# Patient Record
Sex: MALE | Race: WHITE | Employment: FULL TIME | URBAN - METROPOLITAN AREA
[De-identification: names, ages, dates, MRNs, and addresses within clinical notes are randomized per-mention and may not be internally consistent; named-entity substitution may affect disease eponyms.]

---

## 2017-11-09 ENCOUNTER — HOSPITAL ENCOUNTER (EMERGENCY)
Facility: HOSPITAL | Age: 42
Discharge: HOME | End: 2017-11-09
Attending: EMERGENCY MEDICINE

## 2017-11-09 ENCOUNTER — APPOINTMENT (OUTPATIENT)
Dept: CT IMAGING | Facility: HOSPITAL | Age: 42
End: 2017-11-09
Attending: EMERGENCY MEDICINE

## 2017-11-09 VITALS
RESPIRATION RATE: 14 BRPM | HEART RATE: 70 BPM | OXYGEN SATURATION: 97 % | WEIGHT: 156 LBS | DIASTOLIC BLOOD PRESSURE: 70 MMHG | HEIGHT: 68 IN | TEMPERATURE: 99 F | BODY MASS INDEX: 23.64 KG/M2 | SYSTOLIC BLOOD PRESSURE: 117 MMHG

## 2017-11-09 DIAGNOSIS — R10.31 ABDOMINAL PAIN, RIGHT LOWER QUADRANT: ICD-10-CM

## 2017-11-09 DIAGNOSIS — S29.019A STRAIN OF THORACIC REGION, INITIAL ENCOUNTER: Primary | ICD-10-CM

## 2017-11-09 PROCEDURE — 80053 COMPREHEN METABOLIC PANEL: CPT | Performed by: EMERGENCY MEDICINE

## 2017-11-09 PROCEDURE — 99284 EMERGENCY DEPT VISIT MOD MDM: CPT

## 2017-11-09 PROCEDURE — 71275 CT ANGIOGRAPHY CHEST: CPT | Performed by: EMERGENCY MEDICINE

## 2017-11-09 PROCEDURE — 85025 COMPLETE CBC W/AUTO DIFF WBC: CPT | Performed by: EMERGENCY MEDICINE

## 2017-11-09 PROCEDURE — 81003 URINALYSIS AUTO W/O SCOPE: CPT | Performed by: EMERGENCY MEDICINE

## 2017-11-09 PROCEDURE — 74177 CT ABD & PELVIS W/CONTRAST: CPT | Performed by: EMERGENCY MEDICINE

## 2017-11-09 PROCEDURE — 96360 HYDRATION IV INFUSION INIT: CPT

## 2017-11-09 PROCEDURE — 84484 ASSAY OF TROPONIN QUANT: CPT | Performed by: EMERGENCY MEDICINE

## 2017-11-09 PROCEDURE — 83690 ASSAY OF LIPASE: CPT | Performed by: EMERGENCY MEDICINE

## 2017-11-09 RX ORDER — DICYCLOMINE HCL 20 MG
20 TABLET ORAL 4 TIMES DAILY PRN
Qty: 15 TABLET | Refills: 0 | Status: SHIPPED | OUTPATIENT
Start: 2017-11-09 | End: 2021-02-04

## 2017-11-09 RX ORDER — SODIUM CHLORIDE 9 MG/ML
1000 INJECTION, SOLUTION INTRAVENOUS ONCE
Status: COMPLETED | OUTPATIENT
Start: 2017-11-09 | End: 2017-11-09

## 2017-11-10 NOTE — ED PROVIDER NOTES
Patient Seen in: [facility] Emergency Department    History   Patient presents with:  Abdomen/Flank Pain (GI/)    Stated Complaint: abd pain    HPI    Patient is a 51-year-old male presents emergency room with a history of multiple complaints.   Sindi Miller Temp 98.9 °F (37.2 °C) (Temporal)   Resp 14   Ht 172.7 cm (5' 8\")   Wt 70.8 kg   SpO2 97%   BMI 23.72 kg/m²         Physical Exam    GENERAL: Well-developed, well-nourished male sitting up breathing easily in no apparent distress.   Patient is nontoxic in DIFFERENTIAL WITH PLATELET    Narrative: The following orders were created for panel order CBC WITH DIFFERENTIAL WITH PLATELET.   Procedure                               Abnormality         Status                     --------- medications    Dicyclomine HCl 20 MG Oral Tab  Take 1 tablet (20 mg total) by mouth 4 (four) times daily as needed.   Qty: 15 tablet Refills: 0

## 2021-02-04 ENCOUNTER — OFFICE VISIT (OUTPATIENT)
Dept: INTEGRATIVE MEDICINE | Facility: CLINIC | Age: 46
End: 2021-02-04
Payer: COMMERCIAL

## 2021-02-04 VITALS
WEIGHT: 156.19 LBS | SYSTOLIC BLOOD PRESSURE: 108 MMHG | HEIGHT: 68 IN | OXYGEN SATURATION: 97 % | HEART RATE: 78 BPM | BODY MASS INDEX: 23.67 KG/M2 | DIASTOLIC BLOOD PRESSURE: 60 MMHG

## 2021-02-04 DIAGNOSIS — F81.0 READING DIFFICULTY: ICD-10-CM

## 2021-02-04 DIAGNOSIS — L60.9 NAIL ABNORMALITIES: ICD-10-CM

## 2021-02-04 DIAGNOSIS — R41.840 POOR CONCENTRATION: Primary | ICD-10-CM

## 2021-02-04 DIAGNOSIS — L65.9 HAIR LOSS: ICD-10-CM

## 2021-02-04 DIAGNOSIS — L60.8 RAGGED CUTICLE: ICD-10-CM

## 2021-02-04 PROCEDURE — 3008F BODY MASS INDEX DOCD: CPT | Performed by: FAMILY MEDICINE

## 2021-02-04 PROCEDURE — 3074F SYST BP LT 130 MM HG: CPT | Performed by: FAMILY MEDICINE

## 2021-02-04 PROCEDURE — 99204 OFFICE O/P NEW MOD 45 MIN: CPT | Performed by: FAMILY MEDICINE

## 2021-02-04 PROCEDURE — 3078F DIAST BP <80 MM HG: CPT | Performed by: FAMILY MEDICINE

## 2021-02-04 RX ORDER — FINASTERIDE 5 MG/1
5 TABLET, FILM COATED ORAL DAILY
COMMUNITY
Start: 2020-12-04

## 2021-02-04 NOTE — PATIENT INSTRUCTIONS
I have complete bertha in the body's ability to heal and transform. The products and items listed below (the “Products”)  and their claims have not been evaluated by the Food and Drug Administration.  Dietary products are not intended to treat, prevent, m Treatment protocols are individualized based on the results of the comprehensive brain mapping and neurocognitive evaluation. All of this data is reviewed with the family and/or the patient as a basis for understanding this brain network analysis.  If treat This is a Food Sensitivity Test that measures sensitivities to up to 132 different foods, coloring and additives.  The Food Inflammation Test, also known as the FIT Test, was created by Good Simon, Ph. D, who was involved in the creation of the first HIV/

## 2021-02-04 NOTE — PROGRESS NOTES
Tati Reyes is a 42year old male. Patient presents with:  New Patient  Integrative Medicine Consult: food sensitivites      HPI:     Has had food testing done [facility]) about 4 mos ago. Told to not eat meat, which he has been avoiding since.    Inter Never Used    Substance and Sexual Activity      Alcohol use: Yes        Frequency: Monthly or less        Drinks per session: 1 or 2        Binge frequency: Never      Drug use: Not on file      Sexual activity: Not on file    Lifestyle      Physical acti THYROID STIM HORMONE; Future  - FREE T4 (FREE THYROXINE); Future  - FREE T3 (TRIIODOTHYRONINE); Future  - DEHYDROEPIANDROSTERONE SULFATE; Future  - FERRITIN; Future  - PREGNENOLONE BY MS/MS, SERUM; Future  - TESTOSTERONE,FREE AND TOTAL;  Future  - FABRICIO I hormone panel ordered as above. [facility] testing performed today to evaluate for food sensitivities that may relate to GI symptoms and other low level symptoms of inflammation in the body.   [facility] micronutrient panel ordered to evaluate for micronu mental focus:  Equilibria Hemp-Based CBD oil - Daily Drops Regular Strength    Directions: Start 5 drops under the tongue twice daily  Where to Purchase: [email or web address]    Alpha Brain by Antonia      Directions: 2 capsules daily  Where to find: [employer] [email or web address]  Cost: $299    Vitamins  Vitamin A  Vitamin B1  Vitamin B2  Vitamin B3  Vitamin B5  Vitamin B6  Vitamin B12  Vitamin C  Vitamin D, 25-0H  Vitamin D3  Vitamin E  Vitamin K1  Vitamin K2  Folate    Minerals  Calc

## 2021-02-15 ENCOUNTER — TELEPHONE (OUTPATIENT)
Dept: INTEGRATIVE MEDICINE | Facility: CLINIC | Age: 46
End: 2021-02-15

## 2021-02-15 NOTE — TELEPHONE ENCOUNTER
Received voicemail from pt, he scheduled appt for labs tomorrow. Inquiring about instructions - fasting? Left message notifying pt no need to fast, no special instructions or prep prior to labs tomorrow.

## 2021-02-15 NOTE — TELEPHONE ENCOUNTER
Received call from pt, questions regarding [address] and [facility] testing. Discussed results are still pending, can take 4-6 weeks. Dr. Ronnie Velásquez will reach out once we receive results. Pt also has questions regarding getting blood work done.  Provided pt number

## 2021-02-16 ENCOUNTER — LAB ENCOUNTER (OUTPATIENT)
Dept: LAB | Facility: HOSPITAL | Age: 46
End: 2021-02-16
Attending: FAMILY MEDICINE
Payer: COMMERCIAL

## 2021-02-16 DIAGNOSIS — L60.8 RAGGED CUTICLE: ICD-10-CM

## 2021-02-16 DIAGNOSIS — L65.9 HAIR LOSS: ICD-10-CM

## 2021-02-16 DIAGNOSIS — L60.9 NAIL ABNORMALITIES: ICD-10-CM

## 2021-02-16 DIAGNOSIS — R41.840 POOR CONCENTRATION: ICD-10-CM

## 2021-02-16 DIAGNOSIS — F81.0 READING DIFFICULTY: ICD-10-CM

## 2021-02-16 LAB
DEPRECATED HBV CORE AB SER IA-ACNC: 207.2 NG/ML
DHEA-S SERPL-MCNC: 205.3 UG/DL
T3FREE SERPL-MCNC: 2.73 PG/ML (ref 2.4–4.2)
T4 FREE SERPL-MCNC: 0.9 NG/DL (ref 0.8–1.7)
THYROGLOB SERPL-MCNC: <15 U/ML (ref ?–60)
THYROPEROXIDASE AB SERPL-ACNC: 33 U/ML (ref ?–60)
TSI SER-ACNC: 1.12 MIU/ML (ref 0.36–3.74)

## 2021-02-16 PROCEDURE — 84402 ASSAY OF FREE TESTOSTERONE: CPT

## 2021-02-16 PROCEDURE — 36415 COLL VENOUS BLD VENIPUNCTURE: CPT

## 2021-02-16 PROCEDURE — 86628 CANDIDA ANTIBODY: CPT

## 2021-02-16 PROCEDURE — 82728 ASSAY OF FERRITIN: CPT

## 2021-02-16 PROCEDURE — 84443 ASSAY THYROID STIM HORMONE: CPT

## 2021-02-16 PROCEDURE — 84481 FREE ASSAY (FT-3): CPT

## 2021-02-16 PROCEDURE — 86800 THYROGLOBULIN ANTIBODY: CPT

## 2021-02-16 PROCEDURE — 84140 ASSAY OF PREGNENOLONE: CPT

## 2021-02-16 PROCEDURE — 84439 ASSAY OF FREE THYROXINE: CPT

## 2021-02-16 PROCEDURE — 86376 MICROSOMAL ANTIBODY EACH: CPT

## 2021-02-16 PROCEDURE — 84403 ASSAY OF TOTAL TESTOSTERONE: CPT

## 2021-02-16 PROCEDURE — 82627 DEHYDROEPIANDROSTERONE: CPT

## 2021-02-18 LAB
PREGNENOLONE BY MS/MS, SERUM: 40 NG/DL
TESTOSTERONE, FREE, S: 13.3 NG/DL
TESTOSTERONE, TOTAL, S: 370 NG/DL

## 2021-02-19 LAB
CANDIDA ANTIBODY IGA: 0.26 EV
CANDIDA ANTIBODY IGG: 0.71 EV
CANDIDA ANTIBODY IGM: 1.31 EV

## 2021-02-24 ENCOUNTER — TELEPHONE (OUTPATIENT)
Dept: INTEGRATIVE MEDICINE | Facility: CLINIC | Age: 46
End: 2021-02-24

## 2021-02-24 NOTE — TELEPHONE ENCOUNTER
Received call from pt, wondering if Candida diet can be mailed to him. Advised no, I can try to send via [employer]. Otherwise wait for it to come in the mail. Discussed Clinton Gama takes around 6 weeks to get results back.  Once we do get results, Dr. Raphael Gautam will re

## 2021-03-03 NOTE — TELEPHONE ENCOUNTER
[facility] - micronutrient (color copy) received.  Test was done on 02/04/21 - Copy made and placed in bin for review -  Pt has upcoming appointment on 04/27/21 - would you like color copy to be mailed to patient now or place in bin until patient's appointment

## 2021-04-03 ENCOUNTER — IMMUNIZATION (OUTPATIENT)
Dept: LAB | Age: 46
End: 2021-04-03

## 2021-04-03 DIAGNOSIS — Z23 NEED FOR VACCINATION: Primary | ICD-10-CM

## 2021-04-03 PROCEDURE — 0001A COVID 19 PFIZER-BIONTECH: CPT | Performed by: HOSPITALIST

## 2021-04-03 PROCEDURE — 91300 COVID 19 PFIZER-BIONTECH: CPT | Performed by: HOSPITALIST

## 2021-04-13 ENCOUNTER — MED REC SCAN ONLY (OUTPATIENT)
Dept: INTEGRATIVE MEDICINE | Facility: CLINIC | Age: 46
End: 2021-04-13

## 2021-04-24 ENCOUNTER — IMMUNIZATION (OUTPATIENT)
Dept: LAB | Age: 46
End: 2021-04-24
Attending: HOSPITALIST

## 2021-04-24 DIAGNOSIS — Z23 NEED FOR VACCINATION: Primary | ICD-10-CM

## 2021-04-24 PROCEDURE — 0002A COVID 19 PFIZER-BIONTECH: CPT | Performed by: HOSPITALIST

## 2021-04-24 PROCEDURE — 91300 COVID 19 PFIZER-BIONTECH: CPT | Performed by: HOSPITALIST

## 2021-04-27 ENCOUNTER — OFFICE VISIT (OUTPATIENT)
Dept: INTEGRATIVE MEDICINE | Facility: CLINIC | Age: 46
End: 2021-04-27
Payer: COMMERCIAL

## 2021-04-27 VITALS
OXYGEN SATURATION: 98 % | SYSTOLIC BLOOD PRESSURE: 104 MMHG | DIASTOLIC BLOOD PRESSURE: 62 MMHG | HEIGHT: 68 IN | WEIGHT: 152 LBS | BODY MASS INDEX: 23.04 KG/M2 | HEART RATE: 72 BPM

## 2021-04-27 DIAGNOSIS — L60.8 RAGGED CUTICLE: ICD-10-CM

## 2021-04-27 DIAGNOSIS — L60.9 NAIL ABNORMALITIES: ICD-10-CM

## 2021-04-27 DIAGNOSIS — R41.840 POOR CONCENTRATION: Primary | ICD-10-CM

## 2021-04-27 DIAGNOSIS — F81.0 READING DIFFICULTY: ICD-10-CM

## 2021-04-27 DIAGNOSIS — E61.4 CHROMIUM DEFICIENCY: ICD-10-CM

## 2021-04-27 DIAGNOSIS — L65.9 HAIR LOSS: ICD-10-CM

## 2021-04-27 DIAGNOSIS — E56.1 LOW SERUM VITAMIN K: ICD-10-CM

## 2021-04-27 PROCEDURE — 3074F SYST BP LT 130 MM HG: CPT | Performed by: FAMILY MEDICINE

## 2021-04-27 PROCEDURE — 3078F DIAST BP <80 MM HG: CPT | Performed by: FAMILY MEDICINE

## 2021-04-27 PROCEDURE — 3008F BODY MASS INDEX DOCD: CPT | Performed by: FAMILY MEDICINE

## 2021-04-27 PROCEDURE — 99214 OFFICE O/P EST MOD 30 MIN: CPT | Performed by: FAMILY MEDICINE

## 2021-04-27 NOTE — PROGRESS NOTES
Sharonda Casper is a 42year old male. Patient presents with: Integrative Medicine Consult: 3 mo f/u      HPI:     Seen by Dr. Xenia Kidd. Has started neurobiofeedback. No definitive ADD/ADHD  Starting to see improvements with this.     Started some of th Week:       Minutes of Exercise per Session:   Stress:       Feeling of Stress :   Social Connections:       Frequency of Communication with Friends and Family:       Frequency of Social Gatherings with Friends and Family:       Attends [employer] Services: mg) the RDA. It is recommended that physicians ask all patients who may be on biotin supplementation to stop biotin consumption at least 72 hours prior to collection of a new sample.      • Anti-Thyroperoxidase 02/16/2021 33  <60 U/mL Final   • TSH 02/16/2 (RDA) for biotin (0.03 mg) has not been shown to typically cause significant interference; however, high dose daily dietary supplements may contain biotin concentrations greater than 150 times (5–10 mg) the RDA.   It is recommended that physicians ask all p REFERENCE INTERVAL: Pregnenolone by MS/MS, Serum    Access complete set of age- and/or gender-specific reference   intervals for this test in the [facility] Test Directory   ([employer]).     This test was developed and its performance characteristi of antibodies. Repeat testing in                              10-14 days may be helpful. 1.00 EV or greater: . ...  Positive - Antibody to Candida                              albicans detected, which may Laboratories. It has not been cleared                            or   approved by the [employer] and Drug Administration. This test was   performed in a CLIA certified laboratory and is intended for   clinical purposes.    • Candida Antibody IgM 02/16/2021 1.3 supplement recommendations in detail. Given further recommendations as below    Orders Placed This Visit:  No orders of the defined types were placed in this encounter. No orders of the defined types were placed in this encounter.       Patient Inst vitamin K supplement is a good idea. Chromium Picolinate      Directions: 1 capsule daily  Where to Purchase: [email or web address]  This is our [facility] online dispensary for vitamins and supplements where you can receive a 10%

## 2021-04-27 NOTE — PATIENT INSTRUCTIONS
I have complete bertha in the body's ability to heal and transform. The products and items listed below (the “Products”)  and their claims have not been evaluated by the Food and Drug Administration.  Dietary products are not intended to treat, prevent, m off of supplement prices! Inositol - 500mg      Directions: 1 capsule daily  Where to Purchase: [email or web address]  This is our [facility] online dispensary for vitamins and supplements where you can receive a 10% discount off o

## 2022-05-19 ENCOUNTER — OFFICE VISIT (OUTPATIENT)
Dept: PODIATRY CLINIC | Facility: CLINIC | Age: 47
End: 2022-05-19
Payer: COMMERCIAL

## 2022-05-19 VITALS — DIASTOLIC BLOOD PRESSURE: 66 MMHG | SYSTOLIC BLOOD PRESSURE: 114 MMHG | HEART RATE: 76 BPM

## 2022-05-19 DIAGNOSIS — M77.41 METATARSALGIA OF RIGHT FOOT: ICD-10-CM

## 2022-05-19 DIAGNOSIS — M25.871 SESAMOIDITIS OF RIGHT FOOT: ICD-10-CM

## 2022-05-19 PROCEDURE — L3260 AMBULATORY SURGICAL BOOT EAC: HCPCS | Performed by: PODIATRIST

## 2022-05-19 PROCEDURE — 3078F DIAST BP <80 MM HG: CPT | Performed by: PODIATRIST

## 2022-05-19 PROCEDURE — 20600 DRAIN/INJ JOINT/BURSA W/O US: CPT | Performed by: PODIATRIST

## 2022-05-19 PROCEDURE — 3074F SYST BP LT 130 MM HG: CPT | Performed by: PODIATRIST

## 2022-05-19 PROCEDURE — 99203 OFFICE O/P NEW LOW 30 MIN: CPT | Performed by: PODIATRIST

## 2022-05-19 RX ORDER — TRIAMCINOLONE ACETONIDE 40 MG/ML
20 INJECTION, SUSPENSION INTRA-ARTICULAR; INTRAMUSCULAR ONCE
Status: COMPLETED | OUTPATIENT
Start: 2022-05-19 | End: 2022-05-19

## 2022-05-19 NOTE — PROGRESS NOTES
Per Dr. Adriana Burden draw up 0.5ml of 0.5% Marcaine and 0.5ml of Kenalog 40 for injection to right foot.

## 2022-06-21 ENCOUNTER — OFFICE VISIT (OUTPATIENT)
Dept: PODIATRY CLINIC | Facility: CLINIC | Age: 47
End: 2022-06-21
Payer: COMMERCIAL

## 2022-06-21 DIAGNOSIS — M25.871 SESAMOIDITIS OF RIGHT FOOT: Primary | ICD-10-CM

## 2022-06-21 DIAGNOSIS — M77.41 METATARSALGIA OF RIGHT FOOT: ICD-10-CM

## 2022-06-21 PROCEDURE — 99213 OFFICE O/P EST LOW 20 MIN: CPT | Performed by: PODIATRIST

## 2022-08-30 NOTE — ED INITIAL ASSESSMENT (HPI)
[facility]  Dept: [phone]    Milo Del Toro MD    Name: Romeo Cruz      : 1967        You are scheduled to have the following procedure: Colonoscopy      Procedure Date: 2022 Arrival Time: 12:45 Procedure Time: 1:45       Location: Please report to the [facility] Main Entrance 1 hour before your procedure:    [address], [place] [address].     Prep medication(s) ordered:  GoLytely and Zofran/Ondansetron (2 tablets, anti-nausea)     Low Fiber Diet    Please follow 7 days prior to procedure    Eating a low-fiber diet means eating foods that don’t have much fiber. These foods are easy to digest.      Most of the fiber that you eat passes undigested through your bowel. This is what forms stool. Low-fiber foods can help to slow down your bowel movements. When you eat a low-fiber diet, you have fewer stools. This lets your intestine rest.      Your healthcare provider will tell you how long you need to be on this diet. It may only be for a short time. Low-fiber foods often don’t give you all the nutrients you need to stay healthy. Your healthcare provider may have you take certain vitamins while you are on this diet.        Reasons to eat a low-fiber diet   The goal of a low-fiber diet is to limit the size and number of your stools. It may be prescribed if you:      • Are going through chemotherapy or radiation treatments    • Have had intestinal surgery    • Have trouble digesting food   • Have a condition that affects your intestine, such as diarrhea, irritable bowel syndrome, Crohn’s disease, ulcerative colitis, or diverticulitis      General guidelines for a low-fiber diet   In general, a low-fiber diet means having fewer than 13 grams of fiber a day. Your healthcare provider may give you a list of things you can and can’t eat or drink. Read food labels. Choose foods and drinks that have as close to zero grams of fiber as possible. Here  Pt c/o RLQ abdominal pain x 2 days, states pain is intermittent. Pt also c/o pain to left scapula x 6 weeks, states seen by a chiropractor and acupuncturist  without relief. Denies N/V/D. are general guidelines to follow:      Breads, pasta, cereal, rice, and other starches (6 to 11 servings daily)  • What to choose: white bread, biscuits, muffins, and white rolls; plain crackers; waffles; white pasta; white rice; cream of wheat; grits; white pancakes; corn flakes; cooked potatoes without skin; pretzels. Fiber content of these foods should be less than 0.5 (1/2) gram per serving.    • What to pass up: whole-wheat or whole-grain breads, crackers, and pasta; breads with seeds or nuts; wheat germ; emmy crackers; cornbread; wild or brown rice; cereals with whole-grain, bran, and granola; cereals with seeds, nuts, coconut, or dried fruit; potatoes with skin      Milk and dairy (2 servings daily)  • What to choose: milk, buttermilk; yogurt or ice cream without seeds or nuts; custard or pudding; sour cream; cheese and cottage cheese; cream sauces, soups, and casseroles    • What to pass up: ice cream and yogurt with seeds, nuts, or fruit chunks      Fruit (2 to 4 servings daily)  • What to choose: ripe banana; ripe nectarine, peach, apricot, papaya, and plum; soft honeydew melon and cantaloupe; cooked or canned fruit without skin or seeds (not sweetened with sorbitol); applesauce; strained fruit juice (without pulp)    • What to pass up: raw or dried fruit; all berries; raisins; canned and raw pineapple; prunes and prune juice; fruit juice with pulp      Vegetables (3 to 5 servings daily)  • What to choose: well-cooked or canned vegetables without seeds, such as spinach, eggplant, green and wax beans, carrots, yellow squash, pumpkin; lettuce on a sandwich    • What to pass up: all raw or steamed vegetables; vegetables with seeds, such as unstrained tomato sauce; green peas; lima beans; broccoli; corn; parsnips  Meats and protein (4 to 6 ounces daily)   • What to choose: tender, well-cooked meat, including ground meat, poultry, and fish; eggs; tofu; creamy peanut butter    • What to pass up: processed  meats such as hot dogs and sausages, tough, chewy meat with gristle; peas, including split, yellow, and black-eyed; beans, including navy, lima, black, garbanzo, soy, carey, and lentil; peanuts and crunchy peanut butter       Fats, oils, sauces, condiments (fewer than 8 teaspoons daily)   • What to choose: butter, margarine, oils, whipped cream, sour cream, mayonnaise, smooth dressings and sauces; plain gravy; smooth condiments    • What to pass up: dressing with seeds or fruit chunks; pickles and relishes      Other foods and drinks   • What to choose: water; plain gelatin; plain puddings; pretzels; plain cookies and cakes; honey, syrup; decaffeinated drinks, including tea and coffee      • What to pass up: popcorn; potato chips; spicy foods; fried, greasy foods; marmalade, jam, and preserves; desserts that have seeds, nuts, coconut, dried fruit, whole grains, or bran; candy that has seeds or nuts.      DO NOT FOLLOW THE PREP INSTRUCTIONS FROM THE PHARMACY.    Day Before Your Procedure:  · You must follow a CLEAR LIQUID diet the day before the procedure  · Attached is a list of clear liquid diet items  · You will start your prep in the evening, the day before your procedure.    1) Fill the Nulytely/Golytely bottle with water to the line indicated on the bottle. Place cap on the bottle and shake to assure ingredients are dissolved. Once the prep is prepared it may be refrigerated and must be used within 24 hours. You may add one packet of crystal light for flavoring (NO red, blue or purple).    2) Follow the clear liquid diet all day.    3) At 6 pm the evening before your procedure, take one (1) anti-nausea tablet (Zofran/Ondansetron)      4) At 7 pm the evening before your procedure, start drinking 8 oz of the prep every 10 minutes until the bottle is half (1/2) gone. If you become sick, nauseous, and/or bloated, take a break for about 10 to 15 minutes, and then continue to drink the prep.           Day of  Procedure:    5) At 4 am on the day of your procedure, take one (1) anti-nausea tablet (Zofran/Ondansetron)    6) At 5 am on the day of your procedure, drink the rest of the prep, again drinking 8 oz of the prep every 10 minutes until the bottle is completely gone. This needs to be completed by 6 am.     · You must drink the entire bottle to obtain the best results.    · You may become chilled while drinking the prep; do not be alarmed, this is normal. You may continue intake of warm liquids during the time that you are drinking the prep.    NOTHING BY MOUTH (FOOD, WATER, GUM, CANDY, CIGARETTES, ETC)  AFTER 6 AM DAY OF PROCEDURE.    MEDICATION INSTRUCTIONS    Do not take Multi-vitamin, Iron, or fish oil for 7 days prior to procedure.     Do not take Aspirin, Ibuprofen (Motrin), or Naproxen (Aleve) for 3 days prior to procedure. Tylenol is okay.     Please notify the GI Department with any new medication changes.    Please call our office if you need to reschedule your procedure or if you have questions regarding your upcoming procedure: Dept: [phone]    Office Hours:  8:00 am- 4:30 pm Monday-Friday                              Clear Liquid Diet       *No RED, BLUE or PURPLE liquids*    FOOD GROUP RECOMMENDED AVOID   Beverages Fruit juices(e.g. apple, white cranberry, or white grape); pulp-free juices (e.g. orange, lemonade or grapefruit)  Coffee (NO cream, flavored creamer or milk) or tea and carbonated beverages as allowed as tolerated. All others including nectars, prune juices, juices with pulp, tomato or vegetable juice, milk, cream, and cocoa.   Soups Clear broth, bouillon or consomme    Sweets and Desserts Fruit-flavored or unflavored gelatin; fruit ice made from clear fruit juice; plain hard candy; sugar; honey; sugar substitutes; frozen popsicles     Miscellaneous Commercially prepared low residue lactose free nutritional supplements; herbs and mild seasonings, salt and flavor extracts.      Sample  Menu  Breakfast  White Cranberry Juice  Gelatin dessert  Tea/Coffee  Sugar Lunch  Broth  Apple Juice  Carbonated soda  Frozen popsicle  Tea/Coffee Dinner  Broth  Orange juice, strained  Gelatin dessert  Carbonated soda  Tea/Coffee   Mid-morning Snack  Gelatin dessert Mid-afternoon snack  Frozen popsicle Evening Snack  Frozen popsicle

## 2023-07-11 ENCOUNTER — IMAGING SERVICES (OUTPATIENT)
Dept: GENERAL RADIOLOGY | Age: 48
End: 2023-07-11
Attending: ORTHOPAEDIC SURGERY

## 2023-07-11 ENCOUNTER — OFFICE VISIT (OUTPATIENT)
Dept: ORTHOPEDICS | Age: 48
End: 2023-07-11

## 2023-07-11 VITALS — BODY MASS INDEX: 22.73 KG/M2 | HEIGHT: 68 IN | WEIGHT: 150 LBS

## 2023-07-11 DIAGNOSIS — M65.331 TRIGGER MIDDLE FINGER OF RIGHT HAND: ICD-10-CM

## 2023-07-11 DIAGNOSIS — M79.644 FINGER PAIN, RIGHT: Primary | ICD-10-CM

## 2023-07-11 DIAGNOSIS — M79.644 FINGER PAIN, RIGHT: ICD-10-CM

## 2023-07-11 PROCEDURE — 73130 X-RAY EXAM OF HAND: CPT | Performed by: RADIOLOGY

## 2023-07-11 PROCEDURE — 99203 OFFICE O/P NEW LOW 30 MIN: CPT | Performed by: ORTHOPAEDIC SURGERY

## 2023-08-18 ENCOUNTER — TELEPHONE (OUTPATIENT)
Dept: ORTHOPEDICS | Age: 48
End: 2023-08-18

## 2023-08-21 ENCOUNTER — OFFICE VISIT (OUTPATIENT)
Dept: ORTHOPEDICS | Age: 48
End: 2023-08-21

## 2023-08-21 DIAGNOSIS — M65.331 TRIGGER MIDDLE FINGER OF RIGHT HAND: ICD-10-CM

## 2023-08-21 DIAGNOSIS — M79.644 PAIN OF RIGHT MIDDLE FINGER: ICD-10-CM

## 2023-08-21 DIAGNOSIS — S63.659A SAGITTAL BAND RUPTURE AT METACARPOPHALANGEAL JOINT, INITIAL ENCOUNTER: Primary | ICD-10-CM

## 2023-08-21 PROCEDURE — 99213 OFFICE O/P EST LOW 20 MIN: CPT | Performed by: ORTHOPAEDIC SURGERY

## 2023-08-21 RX ORDER — ADAPALENE GEL USP, 0.3% 3 MG/G
GEL TOPICAL
COMMUNITY
Start: 2023-06-29

## 2023-08-21 RX ORDER — FINASTERIDE 1 MG/1
1 TABLET, FILM COATED ORAL DAILY
COMMUNITY
Start: 2023-07-18

## 2023-09-15 ENCOUNTER — TELEPHONE (OUTPATIENT)
Dept: ORTHOPEDICS | Age: 48
End: 2023-09-15

## 2023-10-13 ENCOUNTER — EXTERNAL RECORD (OUTPATIENT)
Dept: HEALTH INFORMATION MANAGEMENT | Facility: OTHER | Age: 48
End: 2023-10-13

## 2023-11-15 ENCOUNTER — OFFICE VISIT (OUTPATIENT)
Dept: ORTHOPEDICS | Age: 48
End: 2023-11-15

## 2023-11-15 VITALS — WEIGHT: 150 LBS | BODY MASS INDEX: 22.73 KG/M2 | HEIGHT: 68 IN

## 2023-11-15 DIAGNOSIS — M65.331 TRIGGER MIDDLE FINGER OF RIGHT HAND: Primary | ICD-10-CM

## 2023-11-15 PROCEDURE — 99213 OFFICE O/P EST LOW 20 MIN: CPT | Performed by: ORTHOPAEDIC SURGERY

## 2023-11-15 PROCEDURE — 20550 NJX 1 TENDON SHEATH/LIGAMENT: CPT | Performed by: ORTHOPAEDIC SURGERY

## 2023-11-15 RX ORDER — TRIAMCINOLONE ACETONIDE 40 MG/ML
20 INJECTION, SUSPENSION INTRA-ARTICULAR; INTRAMUSCULAR
Status: COMPLETED | OUTPATIENT
Start: 2023-11-15 | End: 2023-11-15

## 2023-11-15 RX ORDER — LIDOCAINE HYDROCHLORIDE 10 MG/ML
0.5 INJECTION, SOLUTION INFILTRATION; PERINEURAL
Status: COMPLETED | OUTPATIENT
Start: 2023-11-15 | End: 2023-11-15

## 2023-11-15 RX ADMIN — LIDOCAINE HYDROCHLORIDE 0.5 ML: 10 INJECTION, SOLUTION INFILTRATION; PERINEURAL at 11:24

## 2023-11-15 RX ADMIN — TRIAMCINOLONE ACETONIDE 20 MG: 40 INJECTION, SUSPENSION INTRA-ARTICULAR; INTRAMUSCULAR at 11:24

## 2024-01-03 ENCOUNTER — APPOINTMENT (OUTPATIENT)
Dept: ORTHOPEDICS | Age: 49
End: 2024-01-03

## 2024-01-03 DIAGNOSIS — M79.641 BILATERAL HAND PAIN: Primary | ICD-10-CM

## 2024-01-03 DIAGNOSIS — M79.644 PAIN OF RIGHT MIDDLE FINGER: ICD-10-CM

## 2024-01-03 DIAGNOSIS — M79.642 BILATERAL HAND PAIN: Primary | ICD-10-CM

## 2024-01-03 PROCEDURE — 99213 OFFICE O/P EST LOW 20 MIN: CPT | Performed by: ORTHOPAEDIC SURGERY

## 2024-03-20 ENCOUNTER — APPOINTMENT (OUTPATIENT)
Dept: PHYSICAL MEDICINE AND REHAB | Age: 49
End: 2024-03-20
Attending: ORTHOPAEDIC SURGERY

## 2024-03-25 ENCOUNTER — APPOINTMENT (OUTPATIENT)
Dept: PHYSICAL MEDICINE AND REHAB | Age: 49
End: 2024-03-25
Attending: ORTHOPAEDIC SURGERY

## 2024-04-04 ENCOUNTER — EXTERNAL RECORD (OUTPATIENT)
Dept: HEALTH INFORMATION MANAGEMENT | Facility: OTHER | Age: 49
End: 2024-04-04

## (undated) NOTE — LETTER
AUTHORIZATION FOR SURGICAL OPERATION OR OTHER PROCEDURE    1. I hereby authorize Dr. Emilee Paget, and [facility] assigned to my case to perform the following operation and/or procedure at the [facility]:    _______________________________________________________________________________________________    Cortisone injection Right foot  _______________________________________________________________________________________________    2. My physician has explained the nature and purpose of the operation or other procedure, possible alternative methods of treatment, the risks involved, and the possibility of complication to me. I acknowledge that no guarantee has been made as to the result that may be obtained. 3.  I recognize that, during the course of this operation, or other procedure, unforseen conditions may necessitate additional or different procedure than those listed above. I, therefore, further authorize and request that the above named physician, his/her physician assistants or designees perform such procedures as are, in his/her professional opinion, necessary and desirable. 4.  Any tissue or organs removed in the operation or other procedure may be disposed of by and at the discretion of the [facility] and [facility]. 5.  I understand that in the event of a medical emergency, I will be transported by local paramedics to [facility] or other hospital emergency department. 6.  I certify that I have read and fully understand the above consent to operation and/or other procedure. 7.  I acknowledge that my physician has explained sedation/analgesia administration to me including the risks and benefits. I consent to the administration of sedation/analgesia as may be necessary or desirable in the judgement of my physician.     Witness signature: ___________________________________________________ Date:  ______/______/_____ Time:  ________ A. M.  P.M. Patient Name:  ______________________________________________________  (please print)      Patient signature:  ___________________________________________________             Relationship to Patient:           []  Parent    Responsible person                          []  Spouse  In case of minor or                    [] Other  _____________   Incompetent name:  __________________________________________________                               (please print)      _____________      Responsible person  In case of minor or  Incompetent signature:  _______________________________________________    Statement of Physician  My signature below affirms that prior to the time of the procedure, I have explained to the patient and/or his/her guardian, the risks and benefits involved in the proposed treatment and any reasonable alternative to the proposed treatment. I have also explained the risks and benefits involved in the refusal of the proposed treatment and have answered the patient's questions.                         Date:  ______/______/_______  Provider                      Signature:  __________________________________________________________       Time:  ___________ A.M    P.M.

## (undated) NOTE — LETTER
Dr. Cosmo Lennox, DO     [address] , [address], [id number] , [address]  [address] [place] [address]  [phone]       02/26/21    Rafy Stephens     Please see Candida diet list attached.      Best r

## (undated) NOTE — ED AVS SNAPSHOT
Mr. Chrissy Aggarwal   MRN: [id number]    Department:  [facility] Emergency Department   Date of Visit:  11/9/2017           Disclosure     Insurance plans vary and the physician(s) referred by the ER may not be covered by your plan.  Please contact If you have been prescribed any medication(s), please fill your prescription right away and begin taking the medication(s) as directed    If the emergency physician has read X-rays, these will be re-interpreted by a radiologist.  If there is a significant